# Patient Record
Sex: FEMALE | Race: OTHER | HISPANIC OR LATINO | URBAN - METROPOLITAN AREA
[De-identification: names, ages, dates, MRNs, and addresses within clinical notes are randomized per-mention and may not be internally consistent; named-entity substitution may affect disease eponyms.]

---

## 2018-05-30 ENCOUNTER — EMERGENCY (EMERGENCY)
Facility: HOSPITAL | Age: 38
LOS: 1 days | Discharge: HOME | End: 2018-05-30
Attending: EMERGENCY MEDICINE
Payer: SELF-PAY

## 2018-05-30 VITALS
TEMPERATURE: 99 F | RESPIRATION RATE: 18 BRPM | HEART RATE: 92 BPM | OXYGEN SATURATION: 99 % | DIASTOLIC BLOOD PRESSURE: 80 MMHG | SYSTOLIC BLOOD PRESSURE: 107 MMHG

## 2018-05-30 VITALS — WEIGHT: 188.05 LBS | HEIGHT: 65 IN

## 2018-05-30 DIAGNOSIS — Z98.891 HISTORY OF UTERINE SCAR FROM PREVIOUS SURGERY: Chronic | ICD-10-CM

## 2018-05-30 DIAGNOSIS — Z98.51 TUBAL LIGATION STATUS: Chronic | ICD-10-CM

## 2018-05-30 PROBLEM — Z00.00 ENCOUNTER FOR PREVENTIVE HEALTH EXAMINATION: Status: ACTIVE | Noted: 2018-05-30

## 2018-05-30 LAB
APPEARANCE UR: CLEAR — SIGNIFICANT CHANGE UP
BILIRUB UR-MCNC: NEGATIVE — SIGNIFICANT CHANGE UP
COLOR SPEC: YELLOW — SIGNIFICANT CHANGE UP
DIFF PNL FLD: NEGATIVE — SIGNIFICANT CHANGE UP
GLUCOSE UR QL: NEGATIVE MG/DL — SIGNIFICANT CHANGE UP
HCG UR QL: NEGATIVE — SIGNIFICANT CHANGE UP
KETONES UR-MCNC: NEGATIVE — SIGNIFICANT CHANGE UP
LEUKOCYTE ESTERASE UR-ACNC: NEGATIVE — SIGNIFICANT CHANGE UP
NITRITE UR-MCNC: NEGATIVE — SIGNIFICANT CHANGE UP
PH UR: 8 — SIGNIFICANT CHANGE UP (ref 5–8)
PROT UR-MCNC: NEGATIVE MG/DL — SIGNIFICANT CHANGE UP
SP GR SPEC: 1.01 — SIGNIFICANT CHANGE UP (ref 1.01–1.02)
UROBILINOGEN FLD QL: NEGATIVE MG/DL — SIGNIFICANT CHANGE UP

## 2018-05-30 PROCEDURE — 72100 X-RAY EXAM L-S SPINE 2/3 VWS: CPT | Mod: 26

## 2018-05-30 PROCEDURE — 70450 CT HEAD/BRAIN W/O DYE: CPT | Mod: 26

## 2018-05-30 PROCEDURE — 21310: CPT

## 2018-05-30 PROCEDURE — 70486 CT MAXILLOFACIAL W/O DYE: CPT | Mod: 26

## 2018-05-30 PROCEDURE — 99284 EMERGENCY DEPT VISIT MOD MDM: CPT | Mod: 25

## 2018-05-30 PROCEDURE — 81025 URINE PREGNANCY TEST: CPT

## 2018-05-30 PROCEDURE — 72100 X-RAY EXAM L-S SPINE 2/3 VWS: CPT

## 2018-05-30 PROCEDURE — 99053 MED SERV 10PM-8AM 24 HR FAC: CPT

## 2018-05-30 PROCEDURE — 70486 CT MAXILLOFACIAL W/O DYE: CPT

## 2018-05-30 PROCEDURE — 81003 URINALYSIS AUTO W/O SCOPE: CPT

## 2018-05-30 PROCEDURE — 70450 CT HEAD/BRAIN W/O DYE: CPT

## 2018-05-30 RX ORDER — ACETAMINOPHEN 500 MG
650 TABLET ORAL ONCE
Qty: 0 | Refills: 0 | Status: COMPLETED | OUTPATIENT
Start: 2018-05-30 | End: 2018-05-30

## 2018-05-30 RX ORDER — METHOCARBAMOL 500 MG/1
1500 TABLET, FILM COATED ORAL ONCE
Qty: 0 | Refills: 0 | Status: COMPLETED | OUTPATIENT
Start: 2018-05-30 | End: 2018-05-30

## 2018-05-30 RX ORDER — IBUPROFEN 200 MG
600 TABLET ORAL ONCE
Qty: 0 | Refills: 0 | Status: COMPLETED | OUTPATIENT
Start: 2018-05-30 | End: 2018-05-30

## 2018-05-30 RX ADMIN — Medication 600 MILLIGRAM(S): at 11:53

## 2018-05-30 RX ADMIN — METHOCARBAMOL 1500 MILLIGRAM(S): 500 TABLET, FILM COATED ORAL at 11:53

## 2018-05-30 RX ADMIN — Medication 650 MILLIGRAM(S): at 11:53

## 2018-05-30 RX ADMIN — Medication 600 MILLIGRAM(S): at 12:50

## 2018-05-30 RX ADMIN — Medication 650 MILLIGRAM(S): at 09:13
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
## 2018-05-30 NOTE — ED PROVIDER NOTE - ENMT, MLM
Small abrasion on nasal bridge. Nasal bridge slightly deviated to the right and tender. No nasal sulcal hematoma. Left and right ear clear tm. Small abrasion on L nasal bridge. Nasal bridge slightly deviated to the right and tender. No nasal septal hematoma. Left and right ear clear tm.

## 2018-05-30 NOTE — ED PROVIDER NOTE - PROGRESS NOTE DETAILS
Pt informed of CT findings.  WIll speak w/ radiology for xray read. Declining that pain meds be snt to her pharmacy. per radiology, no acute findings on xray L spine

## 2018-05-30 NOTE — ED PROVIDER NOTE - MUSCULOSKELETAL, MLM
No midline c spine tenderness. Right paracervical and right paratripses tenderness. Tender in lumbar spine. 5/5 upper and lower extremity. No midline c spine tenderness. Right paracervical and right trapezial tenderness. Tender in lumbar spine. 5/5 strength to b/l upper and lower extremity.
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
## 2018-05-30 NOTE — ED PROVIDER NOTE - NEUROLOGICAL, MLM
Alert and oriented, no focal deficits, no motor or sensory deficits. Alert and oriented, no focal deficits, no motor or sensory deficits. CN II-XII intact; no pronator drift. Normal finger to nose/ heel to shin

## 2018-05-30 NOTE — ED ADULT NURSE REASSESSMENT NOTE - NS ED NURSE REASSESS COMMENT FT1
Assumed care of patient at this time, patient is resting in bed at this time, respirations are even and non labored. Patient states that she is having pain in her head. Patient updated on progress. NAD noted. Will continue to monitor.

## 2018-05-30 NOTE — ED PROVIDER NOTE - SKIN, MLM
Small right frontal abrasion, no scalp hematoma. Large hematoma on posterior left upper arm. Small ecchymosis on dorsal let forearm. Small ecchymosis on infernal medial left maximal. Small right frontal abrasion, no scalp hematoma. Large hematoma on posteriolateral left upper arm. Small ecchymosis on dorsal left forearm. Small ecchymosis on medial left maxiilla

## 2018-05-30 NOTE — ED PROVIDER NOTE - EYES, MLM
Clear bilaterally, pupils equal, round and reactive to light. Clear bilaterally, pupils equal, round and reactive to light. Full EOM; no proptosis

## 2021-04-07 ENCOUNTER — EMERGENCY (EMERGENCY)
Facility: HOSPITAL | Age: 41
LOS: 1 days | Discharge: HOME | End: 2021-04-07
Payer: MEDICARE

## 2021-04-07 VITALS
HEIGHT: 65 IN | DIASTOLIC BLOOD PRESSURE: 102 MMHG | SYSTOLIC BLOOD PRESSURE: 136 MMHG | HEART RATE: 83 BPM | TEMPERATURE: 98 F | RESPIRATION RATE: 18 BRPM | OXYGEN SATURATION: 100 % | WEIGHT: 210.1 LBS

## 2021-04-07 VITALS — DIASTOLIC BLOOD PRESSURE: 90 MMHG | SYSTOLIC BLOOD PRESSURE: 126 MMHG

## 2021-04-07 DIAGNOSIS — Z98.51 TUBAL LIGATION STATUS: Chronic | ICD-10-CM

## 2021-04-07 DIAGNOSIS — Z98.891 HISTORY OF UTERINE SCAR FROM PREVIOUS SURGERY: Chronic | ICD-10-CM

## 2021-04-07 PROBLEM — J45.909 UNSPECIFIED ASTHMA, UNCOMPLICATED: Chronic | Status: ACTIVE | Noted: 2018-05-30

## 2021-04-07 LAB — SARS-COV-2 RNA SPEC QL NAA+PROBE: SIGNIFICANT CHANGE UP

## 2021-04-07 PROCEDURE — U0003: CPT

## 2021-04-07 PROCEDURE — 99282 EMERGENCY DEPT VISIT SF MDM: CPT

## 2021-04-07 PROCEDURE — 99283 EMERGENCY DEPT VISIT LOW MDM: CPT

## 2021-04-07 PROCEDURE — U0005: CPT

## 2021-04-07 NOTE — ED PROVIDER NOTE - OBJECTIVE STATEMENT
Pt presenting to the ER for COVID-19 testing. Denies fevers chills, loss of taste or smell, URI symptoms, chest pain or shortness of breath, nausea vomiting diarrhea abdominal pain, weakness or fatigue. Eating and drinking normal diet. Normal output. Pt requesting testing at this time. [] known exposure [x] no-known exposure [x] travel [] no travel. PT daughter has symp[toms and they just traveled from [place].

## 2021-04-07 NOTE — ED PROVIDER NOTE - PATIENT PORTAL LINK FT
You can access the FollowMyHealth Patient Portal offered by [facility] by registering at the following website: [email or web address]. By joining [employer]’s FollowMyHealth portal, you will also be able to view your health information using other applications (apps) compatible with our system.

## 2021-04-07 NOTE — ED PROVIDER NOTE - CLINICAL SUMMARY MEDICAL DECISION MAKING FREE TEXT BOX
Pt nontoxic appearing, stable vitals, ambulatory with stable saturation without supplemental oxygen. PT does not meet criteria listed in most updated guidelines as per [facility] protocol/algorithm for admission at this time. pt advised about self-quarantine instructions until negative test results and/or symptom resolution. pt advised on hand hygiene, monitoring of symptoms, antipyretic use as well as and fu with primary care provider. Instructions given in pre-printed copy.

## 2022-06-06 ENCOUNTER — NON-APPOINTMENT (OUTPATIENT)
Age: 42
End: 2022-06-06

## 2022-11-15 ENCOUNTER — EMERGENCY (EMERGENCY)
Facility: HOSPITAL | Age: 42
LOS: 1 days | Discharge: HOME | End: 2022-11-15
Attending: EMERGENCY MEDICINE
Payer: MEDICAID

## 2022-11-15 VITALS
DIASTOLIC BLOOD PRESSURE: 94 MMHG | WEIGHT: 195.11 LBS | RESPIRATION RATE: 18 BRPM | OXYGEN SATURATION: 100 % | SYSTOLIC BLOOD PRESSURE: 172 MMHG | HEART RATE: 112 BPM | TEMPERATURE: 98 F

## 2022-11-15 VITALS
RESPIRATION RATE: 18 BRPM | SYSTOLIC BLOOD PRESSURE: 137 MMHG | HEART RATE: 106 BPM | OXYGEN SATURATION: 99 % | DIASTOLIC BLOOD PRESSURE: 80 MMHG

## 2022-11-15 DIAGNOSIS — Z98.51 TUBAL LIGATION STATUS: Chronic | ICD-10-CM

## 2022-11-15 DIAGNOSIS — Z98.891 HISTORY OF UTERINE SCAR FROM PREVIOUS SURGERY: Chronic | ICD-10-CM

## 2022-11-15 LAB
RAPID RVP RESULT: SIGNIFICANT CHANGE UP
SARS-COV-2 RNA SPEC QL NAA+PROBE: SIGNIFICANT CHANGE UP

## 2022-11-15 PROCEDURE — 99284 EMERGENCY DEPT VISIT MOD MDM: CPT

## 2022-11-15 PROCEDURE — 71046 X-RAY EXAM CHEST 2 VIEWS: CPT

## 2022-11-15 PROCEDURE — 0225U NFCT DS DNA&RNA 21 SARSCOV2: CPT

## 2022-11-15 PROCEDURE — 99283 EMERGENCY DEPT VISIT LOW MDM: CPT

## 2022-11-15 PROCEDURE — 71046 X-RAY EXAM CHEST 2 VIEWS: CPT | Mod: 26

## 2022-11-15 RX ORDER — ALBUTEROL 90 UG/1
2 AEROSOL, METERED ORAL
Qty: 1 | Refills: 0
Start: 2022-11-15 | End: 2022-12-14

## 2022-11-15 RX ORDER — ACETAMINOPHEN 500 MG
650 TABLET ORAL ONCE
Refills: 0 | Status: COMPLETED | OUTPATIENT
Start: 2022-11-15 | End: 2022-11-15

## 2022-11-15 RX ADMIN — Medication 650 MILLIGRAM(S): at 21:31

## 2022-11-15 NOTE — ED PROVIDER NOTE - NS ED ATTENDING STATEMENT MOD
Attending Only This was a shared visit with the FLACO. I reviewed and verified the documentation and independently performed the documented:

## 2022-11-15 NOTE — ED PROVIDER NOTE - PHYSICAL EXAMINATION
Gen: Alert, NAD  Head: NC, AT, PERRL, EOMI, normal lids/conjunctiva  ENT: B TM WNL, normal hearing, patent oropharynx without erythema/exudate, uvula midline  Neck: +supple, no tenderness/meningismus/JVD, +Trachea midline  Pulm: Bilateral BS, normal resp effort, no wheeze/stridor/retractions  CV: RRR, no M/R/G, 2+dist pulses  Abd: soft, NT/ND, +BS, no hepatosplenomegaly  Mskel: ROM intact x4 extremities.  no edema/erythema/cyanosis  Skin: no rash, warm, dry  Neuro: AAOx3, no sensory/motor deficits, CN 2-12 intact Gen: Alert, NAD  Head: NC, AT, PERRL, EOMI, normal lids/conjunctiva  ENT: B TM WNL, patent oropharynx without erythema/exudate, uvula midline  Neck: +supple, no tenderness/meningismus/JVD, +Trachea midline  Pulm: Bilateral BS, normal resp effort, no wheeze/stridor/retractions  CV: RRR, no M/R/G, 2+dist pulses  Abd: soft, NT/ND, +BS, no hepatosplenomegaly  Mskel: ROM intact x4 extremities.  no edema/erythema/cyanosis  Skin: no rash, warm, dry  Neuro: grossly intact

## 2022-11-15 NOTE — ED PROVIDER NOTE - NS ED ROS FT
Constitutional: (+) Fatigued (-) fever  (-)chills  (-)sweats  Eyes/ENT: (+) Sore throat   Cardiovascular: (-) chest pain, (-) palpitations (-) edema   Respiratory: (+) cough, (-) shortness of breath   Gastrointestinal: (-)nausea  (-)vomiting, (-) diarrhea  (-) abdominal pain   :  (-)dysuria, (-)frequency, (-)urgency, (-)hematuria  Musculoskeletal: (-) neck pain, (-) back pain, (-) joint pain  Integumentary: (-) rash, (-) edema  Neurological: (-) headache, (-) altered mental status  (-)LOC

## 2022-11-15 NOTE — ED PROVIDER NOTE - OBJECTIVE STATEMENT
HPI:  41 yoF; with PMH signif for asthma; now p/w cough, sore throat and feeling fatigued for 3 days. Pt reports cold like symptoms of body aches and burning CP prior to her current cough. Has not been able to go to work for the last few days. Reports prior hospitalization as a child due to asthma. Pt also states using an inhaler at home with no relief for current symptoms. Fully vaccinated for COVID, but not for the flu.     PMH: asthma   SOCIAL: denies smoking / denies illicit substance use / denies social EtOH HPI:  41 yoF; with PMH signif for asthma; now p/w cough, sore throat and feeling fatigued for 3 days. Pt reports cold like symptoms of body aches and burning CP prior to her current cough. Has not been able to go to work for the last few days. Reports prior hospitalization as a child due to asthma. Pt also states using an inhaler at home with no relief for current symptoms. Fully vaccinated for COVID, but not for the flu.   PMH: asthma   SOCIAL: denies smoking / denies illicit substance use / denies social EtOH

## 2022-11-15 NOTE — ED PROVIDER NOTE - PATIENT PORTAL LINK FT
You can access the FollowMyHealth Patient Portal offered by [facility] by registering at the following website: [email or web address]. By joining [employer]’s FollowMyHealth portal, you will also be able to view your health information using other applications (apps) compatible with our system.

## 2023-09-09 ENCOUNTER — NON-APPOINTMENT (OUTPATIENT)
Age: 43
End: 2023-09-09

## 2023-12-11 NOTE — ED PROVIDER NOTE - NORMAL, MLM
[facility] PROGRESS NOTE    Patient: Meaghan Rodriguez Date: 2023   : 1946 Attending: Vincent Rooney MD   77 year old male Consults:  NSG, ID       Subjective: Patient seen and examined at bedside. Pt was sup and reported no complains, Wife at the bed side. Scheduled for LP today      Allergies:   ALLERGIES:   Allergen Reactions    Ezetimibe Other (See Comments)     Affected brain processing        Medications/Infusions: Reviewed                Vital Last Value 24 Hour Range   Temperature 97.7 °F (36.5 °C) (23) Temp  Min: 97.7 °F (36.5 °C)  Max: 98.1 °F (36.7 °C)   Pulse (!) 52 (23) Pulse  Min: 48  Max: 67   Respiratory 16 (23) Resp  Min: 14  Max: 18   Non-Invasive  Blood Pressure (!) 158/83 (23) BP  Min: 138/72  Max: 158/83   Pulse Oximetry 95 % (23) SpO2  Min: 95 %  Max: 97 %     Vital Today Admitted   Weight 84.4 kg (186 lb 1.1 oz) (23) Weight: 81.6 kg (179 lb 14.3 oz) (23)   Height N/A Height: 5' 6\" (167.6 cm) (23)   BMI N/A BMI (Calculated): 29.04 (23)       Intake/Output:      Intake/Output Summary (Last 24 hours) at 2023 0733  Last data filed at 2023 0600  Gross per 24 hour   Intake 219.57 ml   Output 500 ml   Net -280.43 ml       Physical Exam:   Constitutional:       General: He is not in acute distress.     Appearance: He is well-developed. He is not diaphoretic.   HENT:      Head: Normocephalic; anterior incision c/d/I, no bleeding or discharge, no hematoma     Nose: Nose normal.   Eyes:      General: No scleral icterus.     Extraocular Movements: Extraocular movements intact.      Conjunctiva/sclera: Conjunctivae normal.      Pupils: Pupils are equal, round, and reactive to light.   Neck:      Thyroid: No thyromegaly.      Vascular: No JVD.   Cardiovascular:      Rate and Rhythm: Normal rate and regular rhythm.      Heart sounds: Normal heart sounds. No murmur heard.     No friction rub.    Pulmonary:      Effort: Pulmonary effort is normal. No respiratory distress.      Breath sounds: Normal breath sounds. No wheezing or rales.   Abdominal:      General: Bowel sounds are normal. There is no distension.      Palpations: Abdomen is soft.      Tenderness: There is no abdominal tenderness. There is no guarding or rebound.   Musculoskeletal:         General: No tenderness or deformity.      Cervical back: Neck supple.   Lymphadenopathy:      Cervical: No cervical adenopathy.   Skin:     General: Skin is warm and dry.      Coloration: Skin is not pale.      Findings: No erythema or rash. Surgical Scar at the head on the right  Neurological:      Mental Status: He is alert and oriented to person, place, but not  time.   Psychiatric:         Behavior: Behavior normal.      Laboratory Results:   Recent Labs   Lab 12/11/23  0342 12/10/23  0349 12/09/23  0406 12/08/23  0355 12/07/23  0837 12/05/23  0240 12/04/23  1927   WBC 14.2* 11.6* 9.4 10.6 12.9*   < > 15.3*   HCT 36.2* 37.6* 34.4* 34.5* 32.1*   < > 38.0*   HGB 11.9* 12.6* 12.0* 11.4* 11.1*   < > 12.6*    262 257 226 223   < > 305   INR  --   --   --   --   --   --  1.0   PTT  --   --   --   --   --   --  25   SODIUM 135 135 134* 135 135   < > 134*   POTASSIUM 3.9 3.3* 3.3* 3.9 3.4   < > 3.6   CHLORIDE 113* 111* 106 108 106   < > 100   CO2 18* 19* 23 24 26   < > 31   CALCIUM 8.6 9.1 9.1 8.6 8.7   < > 9.3   GLUCOSE 123* 136* 134* 102* 116*   < > 120*   BUN 27* 23* 21* 22* 26*   < > 23*   CREATININE 1.26* 1.00 0.93 1.04 0.96   < > 1.13   AST  --   --   --   --   --   --  35   GPT  --   --   --   --   --   --  76*   ALKPT  --   --   --   --   --   --  171*   BILIRUBIN  --   --   --   --   --   --  0.6   ALBUMIN  --   --   --   --   --   --  3.7   PHOS  --   --  2.7 2.0* 2.4   < >  --     < > = values in this interval not displayed.       Imaging: No results found.        Assessment/Plans:   Acute metabolic toxic encephalopathy likely 2/2 gram negative  mengitis with concerns of  shunt infection in setting of recent NPH  S/P  shunt removed 12/6/23  -CSF Gram stain with gram-negative bacilli, identified as Serratia.  Noted rapid meningitis/encephalitis panel negative  -Status post  shunt removal/cultures with Serratia.  -ID and NSG following  -continue cefepime CNS dosing  -follow blood cultures       Intractable headache with nausea and vomiting 2/2 above  -NSG following  -steroids continued, diamox added,changed norco to percocet   -add reglan prn, continue zofran prn, add tigan prn   -Large volume LP ordered to be performed today to help with symptom management     Leukocytosis likely 2/2 above,   -monitor wbc and fever   -afebrile    Poor appetite  RD to see pt    H/O Gout  Normal uric acid level    Hypertensive urgency reactive to above infection and intractable headache, present on admission  -bp on admission- >187/86, improved  -restarted metoprolol, increase dose today to 25 mg BID , added back lisinopril 20 mg bid, gradually titrate and monitor  -hydralazine IV prn  -diamox added per NSG     Chronic conditions:  HLD- continue statin  Depression- continue wellbutrin, prozac      D/W primary RN , Wife -12/10    DVT ppx: scds, OOB when able, may need LP soon hold chemical anticoagulation  GI ppx: add protonix while on steroids    Code Status    Code Status: Full Resuscitation     PCP: Luzma Lora MD     fallon all pertinent systems normal

## 2023-12-27 ENCOUNTER — EMERGENCY (EMERGENCY)
Facility: HOSPITAL | Age: 43
LOS: 1 days | Discharge: HOME | End: 2023-12-27
Attending: EMERGENCY MEDICINE
Payer: MEDICAID

## 2023-12-27 VITALS
OXYGEN SATURATION: 99 % | RESPIRATION RATE: 20 BRPM | TEMPERATURE: 99 F | HEART RATE: 150 BPM | SYSTOLIC BLOOD PRESSURE: 136 MMHG | WEIGHT: 215.83 LBS | DIASTOLIC BLOOD PRESSURE: 83 MMHG | HEIGHT: 65 IN

## 2023-12-27 DIAGNOSIS — Z98.891 HISTORY OF UTERINE SCAR FROM PREVIOUS SURGERY: Chronic | ICD-10-CM

## 2023-12-27 DIAGNOSIS — Z98.51 TUBAL LIGATION STATUS: Chronic | ICD-10-CM

## 2023-12-27 LAB
FLUAV AG NPH QL: SIGNIFICANT CHANGE UP
FLUAV AG NPH QL: SIGNIFICANT CHANGE UP
FLUBV AG NPH QL: SIGNIFICANT CHANGE UP
FLUBV AG NPH QL: SIGNIFICANT CHANGE UP
RSV RNA NPH QL NAA+NON-PROBE: DETECTED
RSV RNA NPH QL NAA+NON-PROBE: DETECTED
SARS-COV-2 RNA SPEC QL NAA+PROBE: SIGNIFICANT CHANGE UP
SARS-COV-2 RNA SPEC QL NAA+PROBE: SIGNIFICANT CHANGE UP

## 2023-12-27 PROCEDURE — 99283 EMERGENCY DEPT VISIT LOW MDM: CPT | Mod: 25

## 2023-12-27 PROCEDURE — 87637 SARSCOV2&INF A&B&RSV AMP PRB: CPT

## 2023-12-27 PROCEDURE — 99284 EMERGENCY DEPT VISIT MOD MDM: CPT

## 2023-12-27 PROCEDURE — 93005 ELECTROCARDIOGRAM TRACING: CPT

## 2023-12-27 PROCEDURE — 93010 ELECTROCARDIOGRAM REPORT: CPT

## 2023-12-27 PROCEDURE — 94640 AIRWAY INHALATION TREATMENT: CPT

## 2023-12-27 PROCEDURE — 96372 THER/PROPH/DIAG INJ SC/IM: CPT

## 2023-12-27 RX ORDER — IBUPROFEN 200 MG
600 TABLET ORAL ONCE
Refills: 0 | Status: COMPLETED | OUTPATIENT
Start: 2023-12-27 | End: 2023-12-27

## 2023-12-27 RX ORDER — ALBUTEROL 90 UG/1
2 AEROSOL, METERED ORAL
Qty: 1 | Refills: 0
Start: 2023-12-27 | End: 2024-01-02

## 2023-12-27 RX ORDER — IPRATROPIUM/ALBUTEROL SULFATE 18-103MCG
3 AEROSOL WITH ADAPTER (GRAM) INHALATION ONCE
Refills: 0 | Status: COMPLETED | OUTPATIENT
Start: 2023-12-27 | End: 2023-12-27

## 2023-12-27 RX ORDER — DEXAMETHASONE 0.5 MG/5ML
6 ELIXIR ORAL ONCE
Refills: 0 | Status: COMPLETED | OUTPATIENT
Start: 2023-12-27 | End: 2023-12-27

## 2023-12-27 RX ADMIN — Medication 6 MILLIGRAM(S): at 20:16

## 2023-12-27 RX ADMIN — Medication 3 MILLILITER(S): at 20:09

## 2023-12-27 RX ADMIN — Medication 600 MILLIGRAM(S): at 20:48

## 2023-12-27 NOTE — ED ADULT NURSE NOTE - OBJECTIVE STATEMENT
44 y/o female presents to ED for sore throat that has been getting worse throughout the day. Patient reports everyone at her job had either the flu or covid and she has been progressively feeling worse today. No fevers. Awake alert and oriented x3. Can tolerate PO liquids. Respirations even and unlabored. Medications provided as ordered. IM injection to right glut. All questions answered.

## 2023-12-27 NOTE — ED STATDOCS - CLINICAL SUMMARY MEDICAL DECISION MAKING FREE TEXT BOX
Will obtain viral swab fo RVP, one dose of breathing treatment, send albuterol inhaler to pharmacy, and re-assess. Will obtain viral swab fo RVP, Duoneb treatment, send albuterol inhaler to pharmacy, and re-assess.

## 2023-12-27 NOTE — ED STATDOCS - PATIENT PORTAL LINK FT
You can access the FollowMyHealth Patient Portal offered by [facility] by registering at the following website: [email or web address]. By joining [email or web address]’s FollowMyHealth portal, you will also be able to view your health information using other applications (apps) compatible with our system. You can access the FollowMyHealth Patient Portal offered by [facility] by registering at the following website: [email or web address]. By joining [email or web address]’s FollowMyHealth portal, you will also be able to view your health information using other applications (apps) compatible with our system.

## 2023-12-27 NOTE — ED ADULT TRIAGE NOTE - CHIEF COMPLAINT QUOTE
Pt A&Ox4, NAD. Pt presents  to the ED with complaints of cold like symptoms, CP, SOB. EKG in progress.

## 2023-12-27 NOTE — ED STATDOCS - OBJECTIVE STATEMENT
44 y/o female with PMHx of Asthma presents to the ED c/o HA, sore throat, CP, SOB and body aches. No medication taken for symptoms. No albuterol pump at home. Pt denies fevers, chills, abdominal pain, N/V/D, urinary symptoms. went to urgent care prior to the ED but left before she was seen as she developed CP, SOB while waiting,

## 2023-12-27 NOTE — ED ADULT NURSE NOTE - NSFALLUNIVINTERV_ED_ALL_ED
Bed/Stretcher in lowest position, wheels locked, appropriate side rails in place/Call bell, personal items and telephone in reach/Instruct patient to call for assistance before getting out of bed/chair/stretcher/[place] to call system/Physically safe environment - no spills, clutter or unnecessary equipment Bed/Stretcher in lowest position, wheels locked, appropriate side rails in place/Call bell, personal items and telephone in reach/Instruct patient to call for assistance before getting out of bed/chair/stretcher/[place] to call system/Physically safe environment - no spills, clutter or unnecessary equipment

## 2023-12-28 ENCOUNTER — NON-APPOINTMENT (OUTPATIENT)
Age: 43
End: 2023-12-28

## 2023-12-28 ENCOUNTER — TRANSCRIPTION ENCOUNTER (OUTPATIENT)
Age: 43
End: 2023-12-28

## 2024-10-17 ENCOUNTER — NON-APPOINTMENT (OUTPATIENT)
Age: 44
End: 2024-10-17
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
## 2024-12-03 ENCOUNTER — NON-APPOINTMENT (OUTPATIENT)
Age: 44
End: 2024-12-03

## 2024-12-05 ENCOUNTER — EMERGENCY (EMERGENCY)
Facility: HOSPITAL | Age: 44
LOS: 1 days | Discharge: HOME | End: 2024-12-05
Attending: STUDENT IN AN ORGANIZED HEALTH CARE EDUCATION/TRAINING PROGRAM
Payer: MEDICAID

## 2024-12-05 VITALS
RESPIRATION RATE: 18 BRPM | HEART RATE: 96 BPM | SYSTOLIC BLOOD PRESSURE: 136 MMHG | HEIGHT: 65 IN | OXYGEN SATURATION: 98 % | DIASTOLIC BLOOD PRESSURE: 87 MMHG | TEMPERATURE: 98 F | WEIGHT: 211.2 LBS

## 2024-12-05 DIAGNOSIS — Z98.891 HISTORY OF UTERINE SCAR FROM PREVIOUS SURGERY: Chronic | ICD-10-CM

## 2024-12-05 DIAGNOSIS — Z98.51 TUBAL LIGATION STATUS: Chronic | ICD-10-CM

## 2024-12-05 LAB
ALBUMIN SERPL ELPH-MCNC: 4 G/DL — SIGNIFICANT CHANGE UP (ref 3.3–5.2)
ALP SERPL-CCNC: 69 U/L — SIGNIFICANT CHANGE UP (ref 40–120)
ALT FLD-CCNC: 26 U/L — SIGNIFICANT CHANGE UP
ANION GAP SERPL CALC-SCNC: 14 MMOL/L — SIGNIFICANT CHANGE UP (ref 5–17)
APPEARANCE UR: CLEAR — SIGNIFICANT CHANGE UP
AST SERPL-CCNC: 38 U/L — HIGH
BACTERIA # UR AUTO: NEGATIVE /HPF — SIGNIFICANT CHANGE UP
BASOPHILS # BLD AUTO: 0.04 K/UL — SIGNIFICANT CHANGE UP (ref 0–0.2)
BASOPHILS NFR BLD AUTO: 0.4 % — SIGNIFICANT CHANGE UP (ref 0–2)
BILIRUB SERPL-MCNC: 0.3 MG/DL — LOW (ref 0.4–2)
BILIRUB UR-MCNC: NEGATIVE — SIGNIFICANT CHANGE UP
BUN SERPL-MCNC: 7.3 MG/DL — LOW (ref 8–20)
CALCIUM SERPL-MCNC: 9.2 MG/DL — SIGNIFICANT CHANGE UP (ref 8.4–10.5)
CAST: 0 /LPF — SIGNIFICANT CHANGE UP (ref 0–4)
CHLORIDE SERPL-SCNC: 104 MMOL/L — SIGNIFICANT CHANGE UP (ref 96–108)
CO2 SERPL-SCNC: 23 MMOL/L — SIGNIFICANT CHANGE UP (ref 22–29)
COLOR SPEC: ABNORMAL
CREAT SERPL-MCNC: 0.41 MG/DL — LOW (ref 0.5–1.3)
DIFF PNL FLD: NEGATIVE — SIGNIFICANT CHANGE UP
EGFR: 126 ML/MIN/1.73M2 — SIGNIFICANT CHANGE UP
EOSINOPHIL # BLD AUTO: 0.42 K/UL — SIGNIFICANT CHANGE UP (ref 0–0.5)
EOSINOPHIL NFR BLD AUTO: 4.1 % — SIGNIFICANT CHANGE UP (ref 0–6)
GLUCOSE SERPL-MCNC: 86 MG/DL — SIGNIFICANT CHANGE UP (ref 70–99)
GLUCOSE UR QL: NEGATIVE MG/DL — SIGNIFICANT CHANGE UP
HCG UR QL: NEGATIVE — SIGNIFICANT CHANGE UP
HCT VFR BLD CALC: 38.5 % — SIGNIFICANT CHANGE UP (ref 34.5–45)
HGB BLD-MCNC: 12.6 G/DL — SIGNIFICANT CHANGE UP (ref 11.5–15.5)
IMM GRANULOCYTES NFR BLD AUTO: 0.3 % — SIGNIFICANT CHANGE UP (ref 0–0.9)
KETONES UR-MCNC: NEGATIVE MG/DL — SIGNIFICANT CHANGE UP
LEUKOCYTE ESTERASE UR-ACNC: NEGATIVE — SIGNIFICANT CHANGE UP
LYMPHOCYTES # BLD AUTO: 4.23 K/UL — HIGH (ref 1–3.3)
LYMPHOCYTES # BLD AUTO: 41.3 % — SIGNIFICANT CHANGE UP (ref 13–44)
MCHC RBC-ENTMCNC: 25.6 PG — LOW (ref 27–34)
MCHC RBC-ENTMCNC: 32.7 G/DL — SIGNIFICANT CHANGE UP (ref 32–36)
MCV RBC AUTO: 78.1 FL — LOW (ref 80–100)
MONOCYTES # BLD AUTO: 0.75 K/UL — SIGNIFICANT CHANGE UP (ref 0–0.9)
MONOCYTES NFR BLD AUTO: 7.3 % — SIGNIFICANT CHANGE UP (ref 2–14)
NEUTROPHILS # BLD AUTO: 4.76 K/UL — SIGNIFICANT CHANGE UP (ref 1.8–7.4)
NEUTROPHILS NFR BLD AUTO: 46.6 % — SIGNIFICANT CHANGE UP (ref 43–77)
NITRITE UR-MCNC: NEGATIVE — SIGNIFICANT CHANGE UP
PH UR: 7 — SIGNIFICANT CHANGE UP (ref 5–8)
PLATELET # BLD AUTO: 313 K/UL — SIGNIFICANT CHANGE UP (ref 150–400)
POTASSIUM SERPL-MCNC: 5 MMOL/L — SIGNIFICANT CHANGE UP (ref 3.5–5.3)
POTASSIUM SERPL-SCNC: 5 MMOL/L — SIGNIFICANT CHANGE UP (ref 3.5–5.3)
PROT SERPL-MCNC: 6.9 G/DL — SIGNIFICANT CHANGE UP (ref 6.6–8.7)
PROT UR-MCNC: SIGNIFICANT CHANGE UP MG/DL
RBC # BLD: 4.93 M/UL — SIGNIFICANT CHANGE UP (ref 3.8–5.2)
RBC # FLD: 15 % — HIGH (ref 10.3–14.5)
RBC CASTS # UR COMP ASSIST: 0 /HPF — SIGNIFICANT CHANGE UP (ref 0–4)
SODIUM SERPL-SCNC: 141 MMOL/L — SIGNIFICANT CHANGE UP (ref 135–145)
SP GR SPEC: 1.01 — SIGNIFICANT CHANGE UP (ref 1–1.03)
SQUAMOUS # UR AUTO: 0 /HPF — SIGNIFICANT CHANGE UP (ref 0–5)
UROBILINOGEN FLD QL: 0.2 MG/DL — SIGNIFICANT CHANGE UP (ref 0.2–1)
WBC # BLD: 10.23 K/UL — SIGNIFICANT CHANGE UP (ref 3.8–10.5)
WBC # FLD AUTO: 10.23 K/UL — SIGNIFICANT CHANGE UP (ref 3.8–10.5)
WBC UR QL: 0 /HPF — SIGNIFICANT CHANGE UP (ref 0–5)

## 2024-12-05 PROCEDURE — 99284 EMERGENCY DEPT VISIT MOD MDM: CPT | Mod: 25

## 2024-12-05 PROCEDURE — 36415 COLL VENOUS BLD VENIPUNCTURE: CPT

## 2024-12-05 PROCEDURE — 87591 N.GONORRHOEAE DNA AMP PROB: CPT

## 2024-12-05 PROCEDURE — 87086 URINE CULTURE/COLONY COUNT: CPT

## 2024-12-05 PROCEDURE — 99284 EMERGENCY DEPT VISIT MOD MDM: CPT

## 2024-12-05 PROCEDURE — 80053 COMPREHEN METABOLIC PANEL: CPT

## 2024-12-05 PROCEDURE — 76770 US EXAM ABDO BACK WALL COMP: CPT

## 2024-12-05 PROCEDURE — 84702 CHORIONIC GONADOTROPIN TEST: CPT

## 2024-12-05 PROCEDURE — 81001 URINALYSIS AUTO W/SCOPE: CPT

## 2024-12-05 PROCEDURE — 85025 COMPLETE CBC W/AUTO DIFF WBC: CPT

## 2024-12-05 PROCEDURE — 76770 US EXAM ABDO BACK WALL COMP: CPT | Mod: 26

## 2024-12-05 PROCEDURE — 81025 URINE PREGNANCY TEST: CPT

## 2024-12-05 PROCEDURE — 87491 CHLMYD TRACH DNA AMP PROBE: CPT

## 2024-12-05 RX ORDER — SODIUM CHLORIDE 9 MG/ML
1000 INJECTION, SOLUTION INTRAMUSCULAR; INTRAVENOUS; SUBCUTANEOUS ONCE
Refills: 0 | Status: COMPLETED | OUTPATIENT
Start: 2024-12-05 | End: 2024-12-05

## 2024-12-05 RX ORDER — CEFPODOXIME PROXETIL 100 MG/5ML
100 GRANULE, FOR SUSPENSION ORAL ONCE
Refills: 0 | Status: COMPLETED | OUTPATIENT
Start: 2024-12-05 | End: 2024-12-05

## 2024-12-05 RX ORDER — CEFPODOXIME PROXETIL 100 MG/5ML
1 GRANULE, FOR SUSPENSION ORAL
Qty: 20 | Refills: 0
Start: 2024-12-05 | End: 2024-12-14

## 2024-12-05 RX ORDER — PHENAZOPYRIDINE HCL 200 MG
100 TABLET ORAL ONCE
Refills: 0 | Status: COMPLETED | OUTPATIENT
Start: 2024-12-05 | End: 2024-12-05

## 2024-12-05 RX ADMIN — CEFPODOXIME PROXETIL 100 MILLIGRAM(S): 100 GRANULE, FOR SUSPENSION ORAL at 20:54

## 2024-12-05 RX ADMIN — SODIUM CHLORIDE 1000 MILLILITER(S): 9 INJECTION, SOLUTION INTRAMUSCULAR; INTRAVENOUS; SUBCUTANEOUS at 17:18

## 2024-12-05 RX ADMIN — Medication 100 MILLIGRAM(S): at 16:17

## 2024-12-05 NOTE — ED ADULT NURSE NOTE - OBJECTIVE STATEMENT
pt states she has a UTI , was at [facility] Monday DX UTI, given macrobid, not any better  A&Ox3, resp wnl, c/o back pain

## 2024-12-05 NOTE — ED PROVIDER NOTE - ADDITIONAL NOTES AND INSTRUCTIONS:
Ms. Maria Guadalupe Russo was evaluated in the ED at [facility] on Dec. 5, 2024. She has been cleared to return to work on Dec. 9, 2024. Please contact Dr. Jac Ballard at [phone] with any questions.

## 2024-12-05 NOTE — ED PROVIDER NOTE - PATIENT PORTAL LINK FT
You can access the FollowMyHealth Patient Portal offered by [facility] by registering at the following website: [email or web address]. By joining [employer]’s FollowMyHealth portal, you will also be able to view your health information using other applications (apps) compatible with our system.

## 2024-12-05 NOTE — ED PROVIDER NOTE - CLINICAL SUMMARY MEDICAL DECISION MAKING FREE TEXT BOX
44-year-old female history of proteinuria on lisinopril, asthma presents with left flank pain.  Patient was seen in urgent care 2 days ago was diagnosed with UTI and started on Macrobid.  Patient states she did not have flank pain at that time but despite being on the Macrobid left flank pain has developed.  No dysuria, is endorsing urinary frequency but has increased her fluid intake as well as cranberry juice.  Patient has taken the antibiotic as prescribed and is now had diarrhea.  No fevers.  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  On exam patient is well-appearing abdomen soft nontender, positive left CVA tenderness, no rashes.  Consider pyelonephritis versus less likely renal stone.  Will ultrasound to evaluate for any significant hydro/obstruction, labs to evaluate creatinine, UA.  Will likely change antibiotic from Macrobid to cefpodoxime to cover for pyelo- Sarah Romero MD Attending Physician- 44-year-old female history of proteinuria on lisinopril, asthma presents with left flank pain.  Patient was seen in urgent care 2 days ago was diagnosed with UTI and started on Macrobid.  Patient states she did not have flank pain at that time but despite being on the Macrobid left flank pain has developed.  No dysuria, is endorsing urinary frequency but has increased her fluid intake as well as cranberry juice.  Patient has taken the antibiotic as prescribed and is now had diarrhea.  No fevers.  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  On exam patient is well-appearing abdomen soft nontender, positive left CVA tenderness, no rashes.  Consider pyelonephritis versus less likely renal stone.  Will ultrasound to evaluate for any significant hydro/obstruction, labs to evaluate creatinine, UA.  Will likely change antibiotic from Macrobid to cefpodoxime to cover for pyelo- Sarah Romero MD Attending Physician-    US negative for obstruction. Labs wnl, UA clear. Will upgrade abx to cefpodoxime to cover for pyelo. Will give one dose here and then pt advised to continue at home. Pt advised to treat pain with OTC medication and continue hydrating. Pt deemed stable for discharge. 44-year-old female history of proteinuria on lisinopril, asthma presents with left flank pain.  Patient was seen in urgent care 2 days ago was diagnosed with UTI and started on Macrobid.  Patient states she did not have flank pain at that time but despite being on the Macrobid left flank pain has developed.  No dysuria, is endorsing urinary frequency but has increased her fluid intake as well as cranberry juice.  Patient has taken the antibiotic as prescribed and is now had diarrhea.  No fevers.  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  On exam patient is well-appearing abdomen soft nontender, positive left CVA tenderness, no rashes.  Consider pyelonephritis versus less likely renal stone.  Will ultrasound to evaluate for any significant hydro/obstruction, labs to evaluate creatinine, UA.  Will likely change antibiotic from Macrobid to cefpodoxime to cover for pyelo- Sarah Romero MD Attending Physician-    US negative for obstruction. Labs wnl, UA clear. Will upgrade abx to cefpodoxime to cover for pyelo. Will give one dose here and then pt advised to continue at home. Pt advised to treat pain with OTC medication and continue hydrating. Pt deemed stable for discharge and agrees to plan,

## 2024-12-05 NOTE — ED PROVIDER NOTE - PHYSICAL EXAMINATION
General: NAD  Head: NC, AT  Eyes:: EOMI, no scleral icterus  Ears: No erythema/drainage  Nose: Midline, no bleeding/drainage  Cardiac: RRR, no m/r/g, no lower extremity edema  Respiratory: Clear to auscultation bilaterally, equal chest wall expansions  Abdomen: Soft, nontender, nondistended, nonperitonic, no guarding  Back: +CVA tenderness on L. - on R  MSK/Vascular: full ROM, distal pulses intact, warm extremities  Neuro: AAO x3, sensation to light touch intact   Psych: calm, cooperative, normal affect

## 2024-12-05 NOTE — ED ADULT NURSE NOTE - CHIEF COMPLAINT QUOTE
pt states she has a UTI , was at [facility] Monday DX UTI, given macrobid, not any better  A&Ox3, resp wnl, c/o back pain

## 2024-12-05 NOTE — ED PROVIDER NOTE - OBJECTIVE STATEMENT
44 F hx proteinuria, asthma, presents after week of worsening pain in L flank. Pain is constant and feels like stabbing in certain positions. Denies muscle strain, trauma. Pt went to UC 2 days ago and was found to have UTI, given macrobid but pain has worsened. Urinary frequency persists but pt states she has increased fluid intake. Denies blood in urine. Endorses nausea (from pain) and diarrhea possibly 2/2 to abx. Regular PO intake, denies fever chill cough SOB CP. 44 F hx proteinuria, asthma, presents after week of worsening pain in L flank. Pain is constant and feels like stabbing in certain positions. Radiates to front. Denies muscle strain, trauma. Pt went to UC 2 days ago and was found to have UTI, given macrobid but pain has worsened. Urinary frequency persists but pt states she has increased fluid intake. Denies blood in urine. Endorses nausea (from pain) and diarrhea possibly 2/2 to abx. Regular PO intake, denies fever chill cough SOB CP.

## 2024-12-05 NOTE — ED ADULT TRIAGE NOTE - CHIEF COMPLAINT QUOTE
pt states she has a UTI , was at [facility] Monday DX UTI, given macrobid, not any better  A&Ox3, resp wnl pt states she has a UTI , was at [facility] Monday DX UTI, given macrobid, not any better  A&Ox3, resp wnl, c/o back pain

## 2024-12-06 LAB
C TRACH RRNA SPEC QL NAA+PROBE: SIGNIFICANT CHANGE UP
N GONORRHOEA RRNA SPEC QL NAA+PROBE: SIGNIFICANT CHANGE UP

## 2024-12-07 LAB
CULTURE RESULTS: SIGNIFICANT CHANGE UP
SPECIMEN SOURCE: SIGNIFICANT CHANGE UP